# Patient Record
Sex: FEMALE
[De-identification: names, ages, dates, MRNs, and addresses within clinical notes are randomized per-mention and may not be internally consistent; named-entity substitution may affect disease eponyms.]

---

## 2024-01-18 ENCOUNTER — APPOINTMENT (OUTPATIENT)
Dept: OTOLARYNGOLOGY | Facility: CLINIC | Age: 24
End: 2024-01-18
Payer: COMMERCIAL

## 2024-01-18 VITALS
DIASTOLIC BLOOD PRESSURE: 71 MMHG | BODY MASS INDEX: 21.68 KG/M2 | WEIGHT: 127 LBS | HEIGHT: 64 IN | HEART RATE: 61 BPM | SYSTOLIC BLOOD PRESSURE: 113 MMHG | TEMPERATURE: 98 F

## 2024-01-18 DIAGNOSIS — J30.2 OTHER SEASONAL ALLERGIC RHINITIS: ICD-10-CM

## 2024-01-18 DIAGNOSIS — Z72.89 OTHER PROBLEMS RELATED TO LIFESTYLE: ICD-10-CM

## 2024-01-18 DIAGNOSIS — Z78.9 OTHER SPECIFIED HEALTH STATUS: ICD-10-CM

## 2024-01-18 DIAGNOSIS — R06.83 SNORING: ICD-10-CM

## 2024-01-18 DIAGNOSIS — J34.3 HYPERTROPHY OF NASAL TURBINATES: ICD-10-CM

## 2024-01-18 DIAGNOSIS — G47.33 OBSTRUCTIVE SLEEP APNEA (ADULT) (PEDIATRIC): ICD-10-CM

## 2024-01-18 PROBLEM — Z00.00 ENCOUNTER FOR PREVENTIVE HEALTH EXAMINATION: Status: ACTIVE | Noted: 2024-01-18

## 2024-01-18 PROCEDURE — 31231 NASAL ENDOSCOPY DX: CPT

## 2024-01-18 PROCEDURE — 99204 OFFICE O/P NEW MOD 45 MIN: CPT | Mod: 25

## 2024-01-18 RX ORDER — FLUTICASONE PROPIONATE 50 UG/1
50 SPRAY, METERED NASAL DAILY
Qty: 1 | Refills: 3 | Status: ACTIVE | COMMUNITY
Start: 2024-01-18 | End: 1900-01-01

## 2024-01-18 NOTE — HISTORY OF PRESENT ILLNESS
[de-identified] : Ms. PAZ is a 23-year-old woman who was referred by  for snoring. She was accompanied by her mother, who contributed to history.   Chronic snoring since young age, improved x several years after adenoidectomy at age 11 years old. No witnessed apneas/pauses/gasping, nighttime awakenings, morning headache or daytime somnolence. She does not feel refreshed when she wakes from sleep. She has chronic nasal congestion (night worse than day), worse after rhinoplasty in 3/2021.  Breathing improved with fluticasone nasal spray but doesnt want to have to use this for rest of her life. She is also unhappy with the look of her nose after surgery. Mild seasonal allergies. 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

## 2024-01-18 NOTE — CONSULT LETTER
[Dear  ___] : Dear  [unfilled], [( Thank you for referring [unfilled] for consultation for _____ )] : Thank you for referring [unfilled] for consultation for [unfilled] [Please see my note below.] : Please see my note below. [Consult Closing:] : Thank you very much for allowing me to participate in the care of this patient.  If you have any questions, please do not hesitate to contact me. [Sincerely,] : Sincerely, [FreeTextEntry2] : Jet Hatfield MD [address] [place] [id number]  [FreeTextEntry3] :  Alexia Nagel MD  Otolaryngology, Head and Neck Surgery

## 2024-01-18 NOTE — PROCEDURE
[FreeTextEntry6] : NASAL ENDOSCOPY Indication:  deviated nasal septum (cannot see past anterior portion of inferior turbinates. -Verbal consent was obtained from patient prior to exam.  - Oxymetazoline 0.05% spray applied to nose bilaterally. Nasal endoscopy was performed with flexible scope. Findings:  -- Inferior turbinates mildly edematous bilateral.  Inferior meatus clear bilateral. -- Septum was deviated to the left. -- No polyps either side nose -- Mucus clear bilateral -- Middle and superior turbinates normal bilateral -- Middle meatus clear bilateral.  SER clear bilateral. -- Nasopharynx without mass or exudate.  Adenoids were small. The patient tolerated the procedure well.

## 2024-01-18 NOTE — PHYSICAL EXAM
[FreeTextEntry1] : No hoarseness.  [Nasal Endoscopy Performed] : nasal endoscopy was performed, see procedure section for findings [] : septum deviated to the left [de-identified] : Lack of tip support.  Flat supratip area.  Breathing improved subjectively when tip elevate, when upper lateral cartilages are elevated.  Nasal dorsul straight, slightly to right.  Somewhat small nares bilateral. [de-identified] : inferior turbinate hypertrophy bilateral  [Midline] : trachea located in midline position [de-identified] : Mallampati 2 airway.  Large tongue relative to smaller jaw. [de-identified] : small bilateral  [Normal] : no rashes [FreeTextEntry2] : Small mandible. [de-identified] : Carotid pulses 2+ bilateral.

## 2024-01-18 NOTE — REVIEW OF SYSTEMS
[Patient Intake Form Reviewed] : Patient intake form was reviewed [Seasonal Allergies] : seasonal allergies [As Noted in HPI] : as noted in HPI [Feeling Tired] : feeling tired [Negative] : Heme/Lymph

## 2024-01-18 NOTE — ASSESSMENT
[FreeTextEntry1] : Ms. PAZ is a 23-year-old woman who was evaluated for the following issues:  1.) Snoring - possible mild LELA.  Does not feel rested during sleep.  Likely related more to small mandible and relatively larger tongue than to nasal congestion.  The snoring resolved for a few years after adenoidectomy around age 11 yrs old.  Adenoids and tonsils small on exam today. --> home sleep study. If mild LELA diagnose, would benefit from mandibular advancement device.  2.) Chronic nasal congestion related to lack of nasal tip support, nasal valve collapse and inferior turbinate hypertrophy.  Mild left deviated nasal septum.  Also not happy with cosmetic results from rhinoplasty 2021. --> restart fluticasone nasal spray  --> referral to Dr. Hernandez for revision functional rhinoplasy and inferior turbinate reduction  I will f/up with her after the sleep study is done.

## 2024-01-22 ENCOUNTER — APPOINTMENT (OUTPATIENT)
Dept: OTOLARYNGOLOGY | Facility: CLINIC | Age: 24
End: 2024-01-22
Payer: COMMERCIAL

## 2024-01-22 VITALS
OXYGEN SATURATION: 99 % | BODY MASS INDEX: 21.85 KG/M2 | WEIGHT: 128 LBS | HEART RATE: 60 BPM | SYSTOLIC BLOOD PRESSURE: 107 MMHG | TEMPERATURE: 98 F | HEIGHT: 64 IN | DIASTOLIC BLOOD PRESSURE: 68 MMHG

## 2024-01-22 DIAGNOSIS — R09.81 NASAL CONGESTION: ICD-10-CM

## 2024-01-22 DIAGNOSIS — J34.2 DEVIATED NASAL SEPTUM: ICD-10-CM

## 2024-01-22 DIAGNOSIS — Z98.890 OTHER SPECIFIED POSTPROCEDURAL STATES: ICD-10-CM

## 2024-01-22 DIAGNOSIS — M95.0 ACQUIRED DEFORMITY OF NOSE: ICD-10-CM

## 2024-01-22 PROCEDURE — 99204 OFFICE O/P NEW MOD 45 MIN: CPT

## 2024-01-22 PROCEDURE — 99214 OFFICE O/P EST MOD 30 MIN: CPT

## 2024-01-22 NOTE — ASSESSMENT
[FreeTextEntry1] : .  Plan: - Patient has significant nasal obstruction refractory to maximal medical therapy and structural anatomic abnormalities that would benefit from nasal airway surgery. This would comprise revision septorhinoplasty via open approach (correction of residual caudal septal deviation, support of nasal tip, repair of open roof deformity & camouflage of supratip deficiency, resuspension of midvault), and bilateral turbinate reduction. Given revision nature of the procedure, discussed probably need for extraseptal cartilage graft, likely autologous rib vs ear.  - Discussed in detail the benefits, alternatives, and risks of this procedure which include, but are not limited to, infection, bleeding, scarring, change in appearance, septal perforation, continued nasal obstruction, and need for revision surgery. The patient reports understanding of these risks, the proposed benefits, and alternatives and wishes to proceed.  We will initiate the authorization/scheduling process.   -- Phylicia Hernandez MD Facial Plastic & Reconstructive Surgery

## 2024-01-22 NOTE — HISTORY OF PRESENT ILLNESS
[de-identified] : Ms. SADIE PAZ is a pleasant 23 year female presenting today as referral from Dr Nagel for nasal obstruction.   Pt reports h/o long-standing nasal obstruction, bilateral. She underwent cosmetic/functional rhinoplasty in 3/2021 and reports worsening of her symptoms; during this surgery, she had a dorsal hump removed. Since then, she continues to endorse significant nasal obstruction & mouth breathing. She is currently using Flonase nasal spray which does help with her nasal obstruction. She reports that raising her nasal tip helps her breathing significantly. She has not tried Breathe-Right strips or nasal cones. She denies h/o allergies and has not undergone allergy testing. Denies h/o sinus infections; however, she did have a bad sinus infection immediately after her rhinoplasty for which she was hospitalized. Pt denies h/o nasal trauma. Aesthetically, she is concerned about the width of her nose, a slight depression in her supratip area, and the droopiness of her nasal tip. She was seen by Dr Nagel & referred to me; nasal endoscopy completed at that visit revealed residual leftward septal deviation.  Of note, she also endorses constant snoring which worsened after the rhinoplasty. She does endorse daytime sleepiness but denies difficulty sleeping through the night. She was prescribed a home sleep apnea test by Dr Nagel; has not received it yet.   Past medical/surgical history is unremarkable. Nonsmoker. Not on anticoagulation. 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 Here with her mom.   SCHNOS-C 22/30; SCHNOS-F 16/20

## 2024-01-22 NOTE — PHYSICAL EXAM
[] : septum deviated to the left [de-identified] : moderately thin skin, dorsum midline w/ wide bony vault & open roof deformity, deficiency at supratip area, tip wide/ptotic w/ very poor support & deviated to the right, caudal septum deviated to left, collapse of external nasal valve w/ significant improvement on support of nasal tip, profile line straight, under projection & under rotation, hanging columella [de-identified] : moderate turbinate hypertrophy [Normal] : orientation to person, place, and time: normal